# Patient Record
(demographics unavailable — no encounter records)

---

## 2024-10-25 NOTE — HISTORY OF PRESENT ILLNESS
[de-identified] : Ms. FABIÁN REBOLLEDO is a 62 year old female with a Hx. of hypercholesterolemia, Vitamin B12 deficiency, and osteoporosis (on Prolia follows with GYN) , presents for an annual physical exam. She is doing well and feeling fine. She is compliant with medication and monthly injections. She has not received her shingle or flu vaccine. She is interested in receiving her B12 injection today. Pt. is UTD with routine health screenings. Maintains a healthy diet but admits not exercising regularly. Followed up with ophthalmologist 1 year ago. Denies any exertional chest pain, dyspnea, palpitations, headaches, and dizziness. She is otherwise fine and offers no complaints.

## 2024-10-25 NOTE — HEALTH RISK ASSESSMENT
[Good] : ~his/her~ current health as good [Very Good] : ~his/her~  mood as very good [Yes] : Yes [Little interest or pleasure doing things] : 1) Little interest or pleasure doing things [Feeling down, depressed, or hopeless] : 2) Feeling down, depressed, or hopeless [0] : 2) Feeling down, depressed, or hopeless: Not at all (0) [PHQ-2 Negative - No further assessment needed] : PHQ-2 Negative - No further assessment needed [Never] : Never [Patient reported mammogram was normal] : Patient reported mammogram was normal [Patient reported PAP Smear was normal] : Patient reported PAP Smear was normal [Patient reported bone density results were abnormal] : Patient reported bone density results were abnormal [With Family] : lives with family [# of Members in Household ___] :  household currently consist of [unfilled] member(s) [Employed] : employed [College] : College [] :  [# Of Children ___] : has [unfilled] children [Feels Safe at Home] : Feels safe at home [de-identified] : Socailly, maybe once a week. [de-identified] : Admits she is not active. [de-identified] : Maintains a healthy diet.  [[id number]] : 0 [MammogramDate] : 2024 [PapSmearDate] : 2024 [BoneDensityDate] : 2024 [BoneDensityComments] : Osteopenia.  [ColonoscopyDate] : 05/2018 [ColonoscopyComments] : One polyp removed. Was told to return in 5-10 years.

## 2024-10-25 NOTE — PLAN
[FreeTextEntry1] : Annual physical exam. See plan  Low B12 levels B12 injections monthly administered today.  Hypercholesterolemia Needs low fat/ low cholesterol diet / exercise / repeat fasting lipids cont rosuvastatin 10 mg QHS  Osteoporosis cont. Prolia 60 mg/ml   Low D take D 1000 u/d.  Flu vaccine administered. Bloodwork ordered. Pt. instructed to follow up in 1 week via telehealth for lab results.

## 2025-04-02 NOTE — PLAN
[FreeTextEntry1] : Ms. FABIÁN REBOLLEDO is a 63 year old female with hx of hypercholesterolemia, Vitamin B12 deficiency, and osteoporosis (on Prolia follows with GYN), and breast cancer, medically stable for planned procedure.   Labs/EKG reviewed> stable  EKG : NSR without acute ischemic changes  Hypercholesterolemia cont rosuvastatin 10 mg QHS   Osteoporosis on  Prolia injections

## 2025-04-02 NOTE — ADDENDUM
[FreeTextEntry1] : Documented by Carolyn Mccain acting as a scribe for Dr. Reddy. 03/26/2025   All medical record entries made by the scribe were at my, Dr. Reddy, direction and personally dictated by me on 03/26/2025. I have reviewed the chart an agree that the record accurately reflects my personal performance of the history, physical exam, assessment and plan. I have also personally directed, reviewed, and agreed with the chart.
[FreeTextEntry1] : Documented by Carolyn Mccain acting as a scribe for Dr. Reddy. 03/26/2025   All medical record entries made by the scribe were at my, Dr. Reddy, direction and personally dictated by me on 03/26/2025. I have reviewed the chart an agree that the record accurately reflects my personal performance of the history, physical exam, assessment and plan. I have also personally directed, reviewed, and agreed with the chart.
URINARY RETENTION

## 2025-04-02 NOTE — HISTORY OF PRESENT ILLNESS
[No Pertinent Cardiac History] : no history of aortic stenosis, atrial fibrillation, coronary artery disease, recent myocardial infarction, or implantable device/pacemaker [No Pertinent Pulmonary History] : no history of asthma, COPD, sleep apnea, or smoking [Chronic Anticoagulation] : no chronic anticoagulation [Chronic Kidney Disease] : no chronic kidney disease [Diabetes] : no diabetes [FreeTextEntry1] : RT breast lumpectomy with breast lift  [FreeTextEntry2] : 4/10/25 [FreeTextEntry3] : Dr. Hi / Dr. Davis  [FreeTextEntry4] : Ms. FABIÁN REBOLLEDO is a 63-year-old female with Hx of hypercholesterolemia, Vitamin B12 deficiency, and osteoporosis (on Prolia follows with GYN), and breast cancer, presenting for medical clearance for RT breast lumpectomy with breast lift.   Pt reports she was recently diagnosed with breast cancer after discovering a lump and a subsequent mammogram/sonogram in February. She reports FMHx of breast cancer (mother).  Pt reports genetic testing showed the cancer is estrogen positive, progesterone negative; biomarker Ki-67 was high, 58%. She is BRCA mutation negative is scheduled for further genetic testing post-op.   Denies any SOB, CP, abdominal pain and reports compliance with taking her meds daily.

## 2025-05-28 NOTE — PHYSICAL EXAM

## 2025-05-28 NOTE — HISTORY OF PRESENT ILLNESS
[de-identified] : 1/2016: Chronic gastritis.  [FreeTextEntry1] : 5/2018: One 4 mm polyp. Repeat 5-10 years.

## 2025-05-28 NOTE — ASSESSMENT
[FreeTextEntry1] : 64 y/o female presents for initial evaluation of diarrhea for the past 2-3 weeks. Recently diagnosed with breast cancer, plans to start 4 weeks of chemo. Reviewed last colonoscopy performed 5/2018, resulted with one 4 mm polyp. Denies sob, cp, abdominal pain or rectal bleeding. Denies family hx colon cancer. Will order stool tests and follow up results.    Plan: 1. Ordered stool tests 2. Follow up results with NP 1-2 weeks.

## 2025-06-12 NOTE — PLAN
[FreeTextEntry1] : Ms. FABIÁN REBOLLEDO is a 63-year-old female with hx of hypercholesterolemia, Vitamin B12 deficiency, and osteoporosis (on Prolia follows with GYN), and breast cancer, medically stable for planned procedure.  Labs/EKG reviewed> stable EKG: NSR, 68 bpm, without acute ischemic changes  Hypercholesterolemia cont. rosuvastatin 10 mg QHS  Osteoporosis on Prolia injections.

## 2025-06-12 NOTE — HISTORY OF PRESENT ILLNESS
[(Patient denies any chest pain, claudication, dyspnea on exertion, orthopnea, palpitations or syncope)] : Patient denies any chest pain, claudication, dyspnea on exertion, orthopnea, palpitations or syncope [Aortic Stenosis] : no aortic stenosis [Atrial Fibrillation] : no atrial fibrillation [Coronary Artery Disease] : no coronary artery disease [Recent Myocardial Infarction] : no recent myocardial infarction [Implantable Device/Pacemaker] : no implantable device/pacemaker [Asthma] : no asthma [COPD] : no COPD [Sleep Apnea] : no sleep apnea [Smoker] : not a smoker [Family Member] : no family member with adverse anesthesia reaction/sudden death [Self] : no previous adverse anesthesia reaction [Chronic Anticoagulation] : no chronic anticoagulation [Chronic Kidney Disease] : no chronic kidney disease [Diabetes] : no diabetes [FreeTextEntry1] : Bilateral mastectomy with reconstruction (implants) [FreeTextEntry2] : 07/03/2025 [FreeTextEntry3] : Dr. Levi Davis (Plastic Surgery) and Sumanth Nolasco (Breast Surgeon) [FreeTextEntry4] : Ms. FABIÁN REBOLLEDO is a 63-year-old female with hx. of hypercholesterolemia, Vitamin B12 deficiency, and osteoporosis (on Prolia follows with GYN), and breast cancer, presenting for medical clearance.  Pt. states she is feeling well and offers no complaints. She recently followed with GI after experiencing diarrhea for 3.5 weeks and is feeling better.  Pt reports she will start 4 rounds chemo 3 weeks apart in August for breast cancer (luminal B).    Dr. Davis FAX # [phone] Dr. Nolasco FAX #

## 2025-06-12 NOTE — PHYSICAL EXAM
[No Acute Distress] : no acute distress [Well Nourished] : well nourished [Well Developed] : well developed [Well-Appearing] : well-appearing [Normal Sclera/Conjunctiva] : normal sclera/conjunctiva [PERRL] : pupils equal round and reactive to light [EOMI] : extraocular movements intact [Normal Outer Ear/Nose] : the outer ears and nose were normal in appearance [Normal Oropharynx] : the oropharynx was normal [Normal TMs] : both tympanic membranes were normal [No JVD] : no jugular venous distention [No Lymphadenopathy] : no lymphadenopathy [Supple] : supple [Thyroid Normal, No Nodules] : the thyroid was normal and there were no nodules present [No Respiratory Distress] : no respiratory distress  [No Accessory Muscle Use] : no accessory muscle use [Clear to Auscultation] : lungs were clear to auscultation bilaterally [Normal Rate] : normal rate  [Regular Rhythm] : with a regular rhythm [Normal S1, S2] : normal S1 and S2 [No Murmur] : no murmur heard [No Carotid Bruits] : no carotid bruits [No Abdominal Bruit] : a ~M bruit was not heard ~T in the abdomen [No Varicosities] : no varicosities [Pedal Pulses Present] : the pedal pulses are present [No Edema] : there was no peripheral edema [No Palpable Aorta] : no palpable aorta [No Extremity Clubbing/Cyanosis] : no extremity clubbing/cyanosis [Soft] : abdomen soft [Non Tender] : non-tender [Non-distended] : non-distended [No HSM] : no HSM [No Masses] : no abdominal mass palpated [Normal Bowel Sounds] : normal bowel sounds [Normal Posterior Cervical Nodes] : no posterior cervical lymphadenopathy [Normal Anterior Cervical Nodes] : no anterior cervical lymphadenopathy [No CVA Tenderness] : no CVA  tenderness [No Spinal Tenderness] : no spinal tenderness [No Joint Swelling] : no joint swelling [Grossly Normal Strength/Tone] : grossly normal strength/tone [Coordination Grossly Intact] : coordination grossly intact [No Rash] : no rash [No Focal Deficits] : no focal deficits [Normal Gait] : normal gait [Deep Tendon Reflexes (DTR)] : deep tendon reflexes were 2+ and symmetric [Normal Affect] : the affect was normal [Normal Insight/Judgement] : insight and judgment were intact

## 2025-06-12 NOTE — ADDENDUM
[FreeTextEntry1] : Documented by Carolyn Mccain acting as a scribe for Dr. Reddy. 06/12/2025   All medical record entries made by the scribe were at my, Dr. Reddy, direction and personally dictated by me on 06/12/2025. I have reviewed the chart an agree that the record accurately reflects my personal performance of the history, physical exam, assessment and plan. I have also personally directed, reviewed, and agreed with the chart.

## 2025-06-12 NOTE — HISTORY OF PRESENT ILLNESS
[(Patient denies any chest pain, claudication, dyspnea on exertion, orthopnea, palpitations or syncope)] : Patient denies any chest pain, claudication, dyspnea on exertion, orthopnea, palpitations or syncope [Aortic Stenosis] : no aortic stenosis [Atrial Fibrillation] : no atrial fibrillation [Coronary Artery Disease] : no coronary artery disease [Recent Myocardial Infarction] : no recent myocardial infarction [Implantable Device/Pacemaker] : no implantable device/pacemaker [Asthma] : no asthma [COPD] : no COPD [Sleep Apnea] : no sleep apnea [Smoker] : not a smoker [Family Member] : no family member with adverse anesthesia reaction/sudden death [Self] : no previous adverse anesthesia reaction [Chronic Anticoagulation] : no chronic anticoagulation [Chronic Kidney Disease] : no chronic kidney disease [Diabetes] : no diabetes [FreeTextEntry1] : Bilateral mastectomy with reconstruction (implants) [FreeTextEntry2] : 07/03/2025 [FreeTextEntry3] : Dr. Levi Davis (Plastic Surgery) and Sumanth Nolasco (Breast Surgeon) [FreeTextEntry4] : Ms. FABIÁN REBOLLEDO is a 63-year-old female with hx. of hypercholesterolemia, Vitamin B12 deficiency, and osteoporosis (on Prolia follows with GYN), and breast cancer, presenting for medical clearance.  Pt. states she is feeling well and offers no complaints. She recently followed with GI after experiencing diarrhea for 3.5 weeks and is feeling better.  Pt reports she will start 4 rounds chemo 3 weeks apart in August for breast cancer (luminal B).    Dr. Davis FAX # [phone] Dr. Nolasco FAX #

## 2025-06-12 NOTE — PHYSICAL EXAM
[No Acute Distress] : no acute distress [Well Nourished] : well nourished [Well Developed] : well developed [Well-Appearing] : well-appearing [Normal Sclera/Conjunctiva] : normal sclera/conjunctiva [PERRL] : pupils equal round and reactive to light [EOMI] : extraocular movements intact [Normal Outer Ear/Nose] : the outer ears and nose were normal in appearance [Normal Oropharynx] : the oropharynx was normal [Normal TMs] : both tympanic membranes were normal [No JVD] : no jugular venous distention [No Lymphadenopathy] : no lymphadenopathy [Supple] : supple [Thyroid Normal, No Nodules] : the thyroid was normal and there were no nodules present [No Respiratory Distress] : no respiratory distress  [No Accessory Muscle Use] : no accessory muscle use [Clear to Auscultation] : lungs were clear to auscultation bilaterally [Normal Rate] : normal rate  [Regular Rhythm] : with a regular rhythm [Normal S1, S2] : normal S1 and S2 [No Murmur] : no murmur heard [No Carotid Bruits] : no carotid bruits [No Abdominal Bruit] : a ~M bruit was not heard ~T in the abdomen [No Varicosities] : no varicosities [Pedal Pulses Present] : the pedal pulses are present [No Edema] : there was no peripheral edema [No Palpable Aorta] : no palpable aorta [No Extremity Clubbing/Cyanosis] : no extremity clubbing/cyanosis [Soft] : abdomen soft [Non Tender] : non-tender [Non-distended] : non-distended [No HSM] : no HSM [No Masses] : no abdominal mass palpated [Normal Bowel Sounds] : normal bowel sounds [Normal Posterior Cervical Nodes] : no posterior cervical lymphadenopathy [Normal Anterior Cervical Nodes] : no anterior cervical lymphadenopathy [No CVA Tenderness] : no CVA  tenderness [No Spinal Tenderness] : no spinal tenderness [No Joint Swelling] : no joint swelling [Grossly Normal Strength/Tone] : grossly normal strength/tone [No Rash] : no rash [Coordination Grossly Intact] : coordination grossly intact [No Focal Deficits] : no focal deficits [Normal Gait] : normal gait [Deep Tendon Reflexes (DTR)] : deep tendon reflexes were 2+ and symmetric [Normal Affect] : the affect was normal [Normal Insight/Judgement] : insight and judgment were intact

## 2025-07-20 NOTE — HISTORY OF PRESENT ILLNESS
[de-identified] : Ms. FABIÁN REBOLLEDO is a 63-year-old female with Hx of hypercholesterolemia, Vitamin B12 deficiency, and osteoporosis (on Prolia follows with GYN), and breast cancer s/p bilateral mastectomy with reconstruction 7/3/25, presenting for post-surgical follow up.   Pt reports complications s/p bilateral mastectomy with reconstruction 7/3/25; Pt states she stayed in hospital 6 days s/p procedure due to low OSAT, bibasilar atelectasis, and elevated BP. She was evaluated by pulmonology at [facility] (Dr. Rojas) and CT chest 7/6/25 showed pleural effusion, CT angio showed no PE. She was started on home antibiotics (duricef) and reports she is no longer taking oxycontin for pain. Pt c/o continuing SOB while talking, continuing congestion/clear phlegm, and "feeling winded." Pt reports she will start chemotherapy next month (4 rounds every 3 weeks).  Denies any CP or abdominal pain.

## 2025-07-20 NOTE — ADDENDUM
[FreeTextEntry1] : Documented by Carolyn Mccain acting as a scribe for Dr. Reddy. 07/14/2025   All medical record entries made by the scribe were at my, Dr. Reddy, direction and personally dictated by me on 07/14/2025. I have reviewed the chart an agree that the record accurately reflects my personal performance of the history, physical exam, assessment and plan. I have also personally directed, reviewed, and agreed with the chart.

## 2025-07-20 NOTE — REVIEW OF SYSTEMS
[Shortness Of Breath] : no shortness of breath [Cough] : no cough [Dyspnea on Exertion] : dyspnea on exertion [Negative] : Respiratory

## 2025-07-20 NOTE — HISTORY OF PRESENT ILLNESS
[de-identified] : Ms. FABIÁN REBOLLEDO is a 63-year-old female with Hx of hypercholesterolemia, Vitamin B12 deficiency, and osteoporosis (on Prolia follows with GYN), and breast cancer s/p bilateral mastectomy with reconstruction 7/3/25, presenting for post-surgical follow up.   Pt reports complications s/p bilateral mastectomy with reconstruction 7/3/25; Pt states she stayed in hospital 6 days s/p procedure due to low OSAT, bibasilar atelectasis, and elevated BP. She was evaluated by pulmonology at [facility] (Dr. Rojas) and CT chest 7/6/25 showed pleural effusion, CT angio showed no PE. She was started on home antibiotics (duricef) and reports she is no longer taking oxycontin for pain. Pt c/o continuing SOB while talking, continuing congestion/clear phlegm, and "feeling winded." Pt reports she will start chemotherapy next month (4 rounds every 3 weeks).  Denies any CP or abdominal pain.

## 2025-07-20 NOTE — PLAN
[FreeTextEntry1] : Follow up   Hospital records reviewed and discussed with patient  Breast cancer s/p b/l mastectomy and reconstruction 7/3/25  following with oncology  Pt scheduled to start chemotherapy in August   Pleural Effusion s/p bilateral mastectomy  Xray Chest  Following with Pulmonology next month at [place] (Dr. Rojas)  Low B12  B12 injection administered today   Hypercholesterolemia cont. rosuvastatin 10 mg QHS  Osteoporosis on Prolia injections.

## 2025-07-20 NOTE — PHYSICAL EXAM
[No Acute Distress] : no acute distress [Well Nourished] : well nourished [Well Developed] : well developed [Well-Appearing] : well-appearing [Normal Sclera/Conjunctiva] : normal sclera/conjunctiva [Normal Outer Ear/Nose] : the outer ears and nose were normal in appearance [Normal Oropharynx] : the oropharynx was normal [No JVD] : no jugular venous distention [No Lymphadenopathy] : no lymphadenopathy [Supple] : supple [No Respiratory Distress] : no respiratory distress  [No Accessory Muscle Use] : no accessory muscle use [Clear to Auscultation] : lungs were clear to auscultation bilaterally [Normal Rate] : normal rate  [Regular Rhythm] : with a regular rhythm [Normal S1, S2] : normal S1 and S2 [Pedal Pulses Present] : the pedal pulses are present [No Edema] : there was no peripheral edema [No Extremity Clubbing/Cyanosis] : no extremity clubbing/cyanosis [Soft] : abdomen soft [Non Tender] : non-tender [Non-distended] : non-distended [Normal Posterior Cervical Nodes] : no posterior cervical lymphadenopathy [Normal Anterior Cervical Nodes] : no anterior cervical lymphadenopathy [No CVA Tenderness] : no CVA  tenderness [No Spinal Tenderness] : no spinal tenderness [No Joint Swelling] : no joint swelling [Grossly Normal Strength/Tone] : grossly normal strength/tone [No Rash] : no rash [Coordination Grossly Intact] : coordination grossly intact [No Focal Deficits] : no focal deficits [Normal Gait] : normal gait [Normal Affect] : the affect was normal [Normal Insight/Judgement] : insight and judgment were intact [de-identified] : + bilateral + breast implant drains

## 2025-07-20 NOTE — PLAN
[FreeTextEntry1] : Follow up   Hospital records reviewed and discussed with patient  Breast cancer s/p b/l mastectomy and reconstruction 7/3/25  following with oncology  Pt scheduled to start chemotherapy in August   Pleural Effusion s/p bilateral mastectomy  Xray Chest  Following with Pulmonology next month at [place] (Dr. Rojas)  Low B12  B12 injection administered today   Hypercholesterolemia cont. rosuvastatin 10 mg QHS  Osteoporosis on Prolia injections.

## 2025-07-20 NOTE — PLAN
[FreeTextEntry1] : Follow up   Hospital records reviewed and discussed with patient  Breast cancer s/p b/l mastectomy and reconstruction 7/3/25  following with oncology  Pt scheduled to start chemotherapy in August   Pleural Effusion s/p bilateral mastectomy  Xray Chest  Following with Pulmonology next month at [place] (Dr. Rojas)  Low B12  B12 injection administered today   Hypercholesterolemia cont. rosuvastatin 10 mg QHS  Osteoporosis on Prolia injections.

## 2025-07-20 NOTE — PHYSICAL EXAM
[No Acute Distress] : no acute distress [Well Nourished] : well nourished [Well Developed] : well developed [Well-Appearing] : well-appearing [Normal Sclera/Conjunctiva] : normal sclera/conjunctiva [Normal Outer Ear/Nose] : the outer ears and nose were normal in appearance [Normal Oropharynx] : the oropharynx was normal [No JVD] : no jugular venous distention [No Lymphadenopathy] : no lymphadenopathy [Supple] : supple [No Respiratory Distress] : no respiratory distress  [No Accessory Muscle Use] : no accessory muscle use [Clear to Auscultation] : lungs were clear to auscultation bilaterally [Normal Rate] : normal rate  [Regular Rhythm] : with a regular rhythm [Normal S1, S2] : normal S1 and S2 [Pedal Pulses Present] : the pedal pulses are present [No Edema] : there was no peripheral edema [No Extremity Clubbing/Cyanosis] : no extremity clubbing/cyanosis [Soft] : abdomen soft [Non Tender] : non-tender [Non-distended] : non-distended [Normal Posterior Cervical Nodes] : no posterior cervical lymphadenopathy [Normal Anterior Cervical Nodes] : no anterior cervical lymphadenopathy [No CVA Tenderness] : no CVA  tenderness [No Spinal Tenderness] : no spinal tenderness [No Joint Swelling] : no joint swelling [Grossly Normal Strength/Tone] : grossly normal strength/tone [No Rash] : no rash [Coordination Grossly Intact] : coordination grossly intact [No Focal Deficits] : no focal deficits [Normal Gait] : normal gait [Normal Affect] : the affect was normal [Normal Insight/Judgement] : insight and judgment were intact [de-identified] : + bilateral + breast implant drains

## 2025-07-20 NOTE — HISTORY OF PRESENT ILLNESS
[de-identified] : Ms. FABIÁN REBOLLEDO is a 63-year-old female with Hx of hypercholesterolemia, Vitamin B12 deficiency, and osteoporosis (on Prolia follows with GYN), and breast cancer s/p bilateral mastectomy with reconstruction 7/3/25, presenting for post-surgical follow up.   Pt reports complications s/p bilateral mastectomy with reconstruction 7/3/25; Pt states she stayed in hospital 6 days s/p procedure due to low OSAT, bibasilar atelectasis, and elevated BP. She was evaluated by pulmonology at [facility] (Dr. Rojas) and CT chest 7/6/25 showed pleural effusion, CT angio showed no PE. She was started on home antibiotics (duricef) and reports she is no longer taking oxycontin for pain. Pt c/o continuing SOB while talking, continuing congestion/clear phlegm, and "feeling winded." Pt reports she will start chemotherapy next month (4 rounds every 3 weeks).  Denies any CP or abdominal pain.